# Patient Record
Sex: FEMALE | Race: WHITE
[De-identification: names, ages, dates, MRNs, and addresses within clinical notes are randomized per-mention and may not be internally consistent; named-entity substitution may affect disease eponyms.]

---

## 2019-04-02 ENCOUNTER — HOSPITAL ENCOUNTER (INPATIENT)
Dept: HOSPITAL 15 - ER | Age: 64
LOS: 2 days | Discharge: HOME | DRG: 394 | End: 2019-04-04
Attending: FAMILY MEDICINE | Admitting: NURSE PRACTITIONER
Payer: SELF-PAY

## 2019-04-02 VITALS — WEIGHT: 226.86 LBS | BODY MASS INDEX: 37.8 KG/M2 | HEIGHT: 65 IN

## 2019-04-02 VITALS — SYSTOLIC BLOOD PRESSURE: 132 MMHG | DIASTOLIC BLOOD PRESSURE: 64 MMHG

## 2019-04-02 DIAGNOSIS — Z83.3: ICD-10-CM

## 2019-04-02 DIAGNOSIS — D25.9: ICD-10-CM

## 2019-04-02 DIAGNOSIS — K42.0: Primary | ICD-10-CM

## 2019-04-02 DIAGNOSIS — E44.1: ICD-10-CM

## 2019-04-02 DIAGNOSIS — E66.01: ICD-10-CM

## 2019-04-02 DIAGNOSIS — I10: ICD-10-CM

## 2019-04-02 DIAGNOSIS — I25.10: ICD-10-CM

## 2019-04-02 DIAGNOSIS — E87.6: ICD-10-CM

## 2019-04-02 DIAGNOSIS — Z98.84: ICD-10-CM

## 2019-04-02 DIAGNOSIS — D64.9: ICD-10-CM

## 2019-04-02 DIAGNOSIS — Z90.49: ICD-10-CM

## 2019-04-02 LAB
ALBUMIN SERPL-MCNC: 3.1 G/DL (ref 3.4–5)
ALP SERPL-CCNC: 126 U/L (ref 45–117)
ALT SERPL-CCNC: 25 U/L (ref 13–56)
ANION GAP SERPL CALCULATED.3IONS-SCNC: 6 MMOL/L (ref 5–15)
APTT PPP: 27.8 SEC (ref 23.78–33.04)
BILIRUB SERPL-MCNC: 0.4 MG/DL (ref 0.2–1)
BUN SERPL-MCNC: 9 MG/DL (ref 7–18)
BUN/CREAT SERPL: 14.8
CALCIUM SERPL-MCNC: 8.6 MG/DL (ref 8.5–10.1)
CHLORIDE SERPL-SCNC: 109 MMOL/L (ref 98–107)
CO2 SERPL-SCNC: 25 MMOL/L (ref 21–32)
GLUCOSE SERPL-MCNC: 104 MG/DL (ref 74–106)
HCT VFR BLD AUTO: 37.6 % (ref 36–46)
HGB BLD-MCNC: 11.8 G/DL (ref 12.2–16.2)
INR PPP: 0.96 (ref 0.9–1.15)
MAGNESIUM SERPL-MCNC: 2.3 MG/DL (ref 1.6–2.6)
MCH RBC QN AUTO: 25.6 PG (ref 28–32)
MCV RBC AUTO: 81.8 FL (ref 80–100)
NRBC BLD QL AUTO: 0.1 %
POTASSIUM SERPL-SCNC: 3.3 MMOL/L (ref 3.5–5.1)
PROT SERPL-MCNC: 6.8 G/DL (ref 6.4–8.2)
PROTHROMBIN TIME: 10.3 SEC (ref 9.27–12.13)
SODIUM SERPL-SCNC: 140 MMOL/L (ref 136–145)

## 2019-04-02 PROCEDURE — 74250 X-RAY XM SM INT 1CNTRST STD: CPT

## 2019-04-02 PROCEDURE — 80053 COMPREHEN METABOLIC PANEL: CPT

## 2019-04-02 PROCEDURE — 83690 ASSAY OF LIPASE: CPT

## 2019-04-02 PROCEDURE — 86850 RBC ANTIBODY SCREEN: CPT

## 2019-04-02 PROCEDURE — 85025 COMPLETE CBC W/AUTO DIFF WBC: CPT

## 2019-04-02 PROCEDURE — 74176 CT ABD & PELVIS W/O CONTRAST: CPT

## 2019-04-02 PROCEDURE — 83735 ASSAY OF MAGNESIUM: CPT

## 2019-04-02 PROCEDURE — 96374 THER/PROPH/DIAG INJ IV PUSH: CPT

## 2019-04-02 PROCEDURE — 84100 ASSAY OF PHOSPHORUS: CPT

## 2019-04-02 PROCEDURE — 86901 BLOOD TYPING SEROLOGIC RH(D): CPT

## 2019-04-02 PROCEDURE — 85730 THROMBOPLASTIN TIME PARTIAL: CPT

## 2019-04-02 PROCEDURE — 85610 PROTHROMBIN TIME: CPT

## 2019-04-02 PROCEDURE — 80061 LIPID PANEL: CPT

## 2019-04-02 PROCEDURE — 83036 HEMOGLOBIN GLYCOSYLATED A1C: CPT

## 2019-04-02 PROCEDURE — 76856 US EXAM PELVIC COMPLETE: CPT

## 2019-04-02 PROCEDURE — 84443 ASSAY THYROID STIM HORMONE: CPT

## 2019-04-02 PROCEDURE — 93306 TTE W/DOPPLER COMPLETE: CPT

## 2019-04-02 PROCEDURE — 86900 BLOOD TYPING SEROLOGIC ABO: CPT

## 2019-04-02 PROCEDURE — 36415 COLL VENOUS BLD VENIPUNCTURE: CPT

## 2019-04-02 PROCEDURE — 84484 ASSAY OF TROPONIN QUANT: CPT

## 2019-04-02 PROCEDURE — 81001 URINALYSIS AUTO W/SCOPE: CPT

## 2019-04-02 RX ADMIN — ONDANSETRON HYDROCHLORIDE PRN MG: 2 INJECTION, SOLUTION INTRAMUSCULAR; INTRAVENOUS at 22:35

## 2019-04-02 RX ADMIN — POTASSIUM CHLORIDE, DEXTROSE MONOHYDRATE AND SODIUM CHLORIDE SCH MLS/HR: 300; 5; 450 INJECTION, SOLUTION INTRAVENOUS at 23:30

## 2019-04-02 RX ADMIN — MORPHINE SULFATE PRN MG: 2 INJECTION, SOLUTION INTRAMUSCULAR; INTRAVENOUS at 17:12

## 2019-04-02 RX ADMIN — ONDANSETRON HYDROCHLORIDE PRN MG: 2 INJECTION, SOLUTION INTRAMUSCULAR; INTRAVENOUS at 17:12

## 2019-04-02 RX ADMIN — POTASSIUM CHLORIDE, DEXTROSE MONOHYDRATE AND SODIUM CHLORIDE SCH MLS/HR: 300; 5; 450 INJECTION, SOLUTION INTRAVENOUS at 19:20

## 2019-04-02 NOTE — NUR
Telemetry admit from ER

HERBIEKATERYNA admitted to Telemetry unit after SBAR received.  Patient oriented to NEETU LIGHT RN primary RN, unit, room, bed, and unit policies regarding patient care and visiting 
hours. Patient now on continuous telemetry monitoring, tele box # 23 and telemetry reading 
on arrival to unit is . Patient placed on bedside oxygen, weighed by bedscale and encouraged 
to call if they need something. All questions and concerns addressed, patient verbalized 
understanding. 

Note:

## 2019-04-03 VITALS — SYSTOLIC BLOOD PRESSURE: 121 MMHG | DIASTOLIC BLOOD PRESSURE: 69 MMHG

## 2019-04-03 VITALS — DIASTOLIC BLOOD PRESSURE: 74 MMHG | SYSTOLIC BLOOD PRESSURE: 126 MMHG

## 2019-04-03 VITALS — DIASTOLIC BLOOD PRESSURE: 70 MMHG | SYSTOLIC BLOOD PRESSURE: 128 MMHG

## 2019-04-03 VITALS — DIASTOLIC BLOOD PRESSURE: 72 MMHG | SYSTOLIC BLOOD PRESSURE: 135 MMHG

## 2019-04-03 VITALS — DIASTOLIC BLOOD PRESSURE: 57 MMHG | SYSTOLIC BLOOD PRESSURE: 124 MMHG

## 2019-04-03 LAB
ALBUMIN SERPL-MCNC: 2.7 G/DL (ref 3.4–5)
ALP SERPL-CCNC: 104 U/L (ref 45–117)
ALT SERPL-CCNC: 21 U/L (ref 13–56)
ANION GAP SERPL CALCULATED.3IONS-SCNC: 3 MMOL/L (ref 5–15)
BILIRUB SERPL-MCNC: 0.4 MG/DL (ref 0.2–1)
BUN SERPL-MCNC: 12 MG/DL (ref 7–18)
BUN/CREAT SERPL: 20
CALCIUM SERPL-MCNC: 8 MG/DL (ref 8.5–10.1)
CHLORIDE SERPL-SCNC: 110 MMOL/L (ref 98–107)
CHOLEST SERPL-MCNC: 134 MG/DL (ref ?–200)
CO2 SERPL-SCNC: 29 MMOL/L (ref 21–32)
GLUCOSE SERPL-MCNC: 110 MG/DL (ref 74–106)
HCT VFR BLD AUTO: 33.1 % (ref 36–46)
HDLC SERPL-MCNC: 58 MG/DL (ref 40–59)
HGB BLD-MCNC: 10.7 G/DL (ref 12.2–16.2)
MCH RBC QN AUTO: 25.9 PG (ref 28–32)
MCV RBC AUTO: 80 FL (ref 80–100)
NRBC BLD QL AUTO: 0 %
POTASSIUM SERPL-SCNC: 3.5 MMOL/L (ref 3.5–5.1)
PROT SERPL-MCNC: 6 G/DL (ref 6.4–8.2)
SODIUM SERPL-SCNC: 142 MMOL/L (ref 136–145)
TRIGL SERPL-MCNC: 87 MG/DL (ref ?–150)

## 2019-04-03 RX ADMIN — POTASSIUM CHLORIDE, DEXTROSE MONOHYDRATE AND SODIUM CHLORIDE SCH MLS/HR: 300; 5; 450 INJECTION, SOLUTION INTRAVENOUS at 15:30

## 2019-04-03 RX ADMIN — POTASSIUM CHLORIDE, DEXTROSE MONOHYDRATE AND SODIUM CHLORIDE SCH MLS/HR: 300; 5; 450 INJECTION, SOLUTION INTRAVENOUS at 18:11

## 2019-04-03 RX ADMIN — POTASSIUM CHLORIDE, DEXTROSE MONOHYDRATE AND SODIUM CHLORIDE SCH MLS/HR: 300; 5; 450 INJECTION, SOLUTION INTRAVENOUS at 23:30

## 2019-04-03 RX ADMIN — POTASSIUM CHLORIDE, DEXTROSE MONOHYDRATE AND SODIUM CHLORIDE SCH MLS/HR: 300; 5; 450 INJECTION, SOLUTION INTRAVENOUS at 05:34

## 2019-04-03 RX ADMIN — MORPHINE SULFATE PRN MG: 2 INJECTION, SOLUTION INTRAMUSCULAR; INTRAVENOUS at 13:27

## 2019-04-03 RX ADMIN — HYDROCODONE BITARTRATE AND ACETAMINOPHEN PRN TAB: 5; 325 TABLET ORAL at 16:36

## 2019-04-03 RX ADMIN — ONDANSETRON HYDROCHLORIDE PRN MG: 2 INJECTION, SOLUTION INTRAMUSCULAR; INTRAVENOUS at 04:04

## 2019-04-03 RX ADMIN — MORPHINE SULFATE PRN MG: 2 INJECTION, SOLUTION INTRAMUSCULAR; INTRAVENOUS at 01:34

## 2019-04-03 RX ADMIN — MORPHINE SULFATE PRN MG: 2 INJECTION, SOLUTION INTRAMUSCULAR; INTRAVENOUS at 09:25

## 2019-04-03 NOTE — NUR
PATIENT C/O 5/10 HEADACHE REQUESTING PAIN MEDICATION.  INFORMED PATIENT HAS NORCO ORDERED Q 
6HRS PRN.  WILL CALL HOSPITALIST FOR TYLENOL PRN.  HOSPITALIST PAGED AWAITING CALL BACK.

## 2019-04-03 NOTE — NUR
assessment

Per  consult No PCP. Patient has no insurance. Patient to be assessed by Gloria herr [facility] 
for [facility]. Patient will then be assigned PCP. 

-------------------------------------------------------------------------------

Addendum: 04/03/19 at 1622 by Kiera Baez 

-------------------------------------------------------------------------------

Amended: Links added.

## 2019-04-03 NOTE — NUR
DR. BARRIOS MADE AWARE PT IS REQUESTING MEDICATION FOR HEADACHE OTHER THAN MORPHINE. HE 
ORDERED NORCO 5/325 PO Q6HRS PRN. HE ORDERED TO STOP MORPHINE.

## 2019-04-03 NOTE — NUR
PT SEEN BY DR. CARCAMO

PER DR. CARCAMO NO INTERVENTION AT THIS TIME, PT CAN START WITH FULL LIQUID DIET.

## 2019-04-03 NOTE — NUR
TRACY CONSULT

PT SEEN BY DR. GARCIA, SHE SAID NO INTERVENTION AT THIS TIME, PT WILL FOLLOW UP AS OUT 
PATIENT.

## 2019-04-03 NOTE — NUR
CALLED RADIOLOGY, SECOND FOLLOW UP FOR SMALL BOWEL SERIES RESULT, SPOKE WITH JUSTIN, SHE 
SAID SHE WILL FOLLOW UP THE RESULT.

## 2019-04-03 NOTE — NUR
DR. BARRIOS AT BEDSIDE

HE SAID TO FOLLOW UP SMALL BOWEL SERIES RESULT AND NOTIFY DR. CARCAMO ONCE RESULT IS BACK.

## 2019-04-03 NOTE — NUR
SMALL BOWEL SERIES RESULT

CALLED RADIOLOGY, SPOKE TO ANGELO, HE SAID HE WILL FOLLOW UP THE READING OF SMALL BOWEL 
SERIES.

## 2019-04-03 NOTE — NUR
Nutrition Assessment/consult Notes



please see activated link for complete assessment



Est. Needs based on AdBW (79 kg): 2411-5428 kcal (20-23 kcal/kgAdBW), 79-86 gms pro (1.0-1.1 
gms/kgAdBW). Will continue to monitor pertinent labs and reassess nutrient need prn




-------------------------------------------------------------------------------

Addendum: 04/03/19 at 1248 by Dali Burris RD

-------------------------------------------------------------------------------

Amended: Links added.

## 2019-04-04 VITALS — SYSTOLIC BLOOD PRESSURE: 113 MMHG | DIASTOLIC BLOOD PRESSURE: 58 MMHG

## 2019-04-04 VITALS — DIASTOLIC BLOOD PRESSURE: 55 MMHG | SYSTOLIC BLOOD PRESSURE: 122 MMHG

## 2019-04-04 VITALS — DIASTOLIC BLOOD PRESSURE: 103 MMHG | SYSTOLIC BLOOD PRESSURE: 148 MMHG

## 2019-04-04 RX ADMIN — HYDROCODONE BITARTRATE AND ACETAMINOPHEN PRN TAB: 5; 325 TABLET ORAL at 05:23

## 2019-04-04 RX ADMIN — POTASSIUM CHLORIDE, DEXTROSE MONOHYDRATE AND SODIUM CHLORIDE SCH MLS/HR: 300; 5; 450 INJECTION, SOLUTION INTRAVENOUS at 09:31

## 2019-04-04 NOTE — NUR
Discharge instructions given as ordered. Encourage to follow up with her pcp  DR. BALDERAS IN 
[place], INSTRUCTED TO FOLLOW UP WITH DOCTOR CARCAMO AND DR. GARCIA, TELEPHONE NUMBER AND 
ADDRESS PROVIDED TO THE PT. All questions and concerns addressed. Patient verbalized 
understanding.  Medication reconciliation form completed and copy given to patient. IV 
removed with catheter intact, pressure dressing applied.  Telemetry unit returned to ICU. 
Patient taken to vehicle via wheelchair with all personal belongings, accompanied by staff 
and family member. No distress noted at time of departure.

## 2019-04-04 NOTE — NUR
PT SEEN BY DR. BARRIOS

HE SAID PT CAN GO HOME , PT IS CLEARED BY DR. CARCAMO AND DR. GARCIA. HE SAID TO GIVE 
METRONIDAZOLE SCHEDULED AT 1200  AND DISCHARGE AFTER

## 2020-06-25 ENCOUNTER — HOSPITAL ENCOUNTER (INPATIENT)
Dept: HOSPITAL 15 - ER | Age: 65
LOS: 4 days | Discharge: HOME | DRG: 871 | End: 2020-06-29
Attending: INTERNAL MEDICINE | Admitting: INTERNAL MEDICINE
Payer: MEDICARE

## 2020-06-25 VITALS — BODY MASS INDEX: 35.57 KG/M2 | HEIGHT: 64 IN | WEIGHT: 208.34 LBS

## 2020-06-25 DIAGNOSIS — I10: ICD-10-CM

## 2020-06-25 DIAGNOSIS — U07.1: ICD-10-CM

## 2020-06-25 DIAGNOSIS — Z98.84: ICD-10-CM

## 2020-06-25 DIAGNOSIS — Z83.3: ICD-10-CM

## 2020-06-25 DIAGNOSIS — A41.89: Primary | ICD-10-CM

## 2020-06-25 DIAGNOSIS — E66.9: ICD-10-CM

## 2020-06-25 DIAGNOSIS — J12.89: ICD-10-CM

## 2020-06-25 LAB
ALBUMIN SERPL-MCNC: 2.9 G/DL (ref 3.4–5)
ALT SERPL-CCNC: 22 U/L (ref 13–56)
ANION GAP SERPL CALCULATED.3IONS-SCNC: 4 MMOL/L (ref 5–15)
BUN SERPL-MCNC: 11 MG/DL (ref 7–18)
BUN/CREAT SERPL: 18.3
CALCIUM SERPL-MCNC: 7.6 MG/DL (ref 8.5–10.1)
CHLORIDE SERPL-SCNC: 110 MMOL/L (ref 98–107)
CO2 SERPL-SCNC: 28 MMOL/L (ref 21–32)
GLUCOSE SERPL-MCNC: 86 MG/DL (ref 74–106)
HCT VFR BLD AUTO: 31.6 % (ref 36–46)
HGB BLD-MCNC: 9.7 G/DL (ref 12.2–16.2)
MAGNESIUM SERPL-MCNC: 2.2 MG/DL (ref 1.6–2.6)
MCH RBC QN AUTO: 22.5 PG (ref 28–32)
MCV RBC AUTO: 73.5 FL (ref 80–100)
NRBC BLD QL AUTO: 0 %
POTASSIUM SERPL-SCNC: 3.5 MMOL/L (ref 3.5–5.1)
SODIUM SERPL-SCNC: 142 MMOL/L (ref 136–145)

## 2020-06-25 PROCEDURE — 85025 COMPLETE CBC W/AUTO DIFF WBC: CPT

## 2020-06-25 PROCEDURE — 83605 ASSAY OF LACTIC ACID: CPT

## 2020-06-25 PROCEDURE — 71045 X-RAY EXAM CHEST 1 VIEW: CPT

## 2020-06-25 PROCEDURE — 80053 COMPREHEN METABOLIC PANEL: CPT

## 2020-06-25 PROCEDURE — 96365 THER/PROPH/DIAG IV INF INIT: CPT

## 2020-06-25 PROCEDURE — 85379 FIBRIN DEGRADATION QUANT: CPT

## 2020-06-25 PROCEDURE — 80048 BASIC METABOLIC PNL TOTAL CA: CPT

## 2020-06-25 PROCEDURE — 81001 URINALYSIS AUTO W/SCOPE: CPT

## 2020-06-25 PROCEDURE — 87070 CULTURE OTHR SPECIMN AEROBIC: CPT

## 2020-06-25 PROCEDURE — 87880 STREP A ASSAY W/OPTIC: CPT

## 2020-06-25 PROCEDURE — 83735 ASSAY OF MAGNESIUM: CPT

## 2020-06-25 PROCEDURE — 94640 AIRWAY INHALATION TREATMENT: CPT

## 2020-06-25 PROCEDURE — 36415 COLL VENOUS BLD VENIPUNCTURE: CPT

## 2020-06-25 PROCEDURE — 87804 INFLUENZA ASSAY W/OPTIC: CPT

## 2020-06-25 PROCEDURE — 87040 BLOOD CULTURE FOR BACTERIA: CPT

## 2020-06-25 PROCEDURE — 82728 ASSAY OF FERRITIN: CPT

## 2020-06-25 PROCEDURE — 96367 TX/PROPH/DG ADDL SEQ IV INF: CPT

## 2020-06-25 PROCEDURE — 74018 RADEX ABDOMEN 1 VIEW: CPT

## 2020-06-25 PROCEDURE — 93005 ELECTROCARDIOGRAM TRACING: CPT

## 2020-06-26 VITALS — DIASTOLIC BLOOD PRESSURE: 56 MMHG | SYSTOLIC BLOOD PRESSURE: 130 MMHG

## 2020-06-26 VITALS — DIASTOLIC BLOOD PRESSURE: 94 MMHG | SYSTOLIC BLOOD PRESSURE: 128 MMHG

## 2020-06-26 LAB
ALP SERPL-CCNC: 113 U/L (ref 45–117)
BILIRUB SERPL-MCNC: 0.3 MG/DL (ref 0.2–1)
PROT SERPL-MCNC: 6.6 G/DL (ref 6.4–8.2)

## 2020-06-26 RX ADMIN — ALBUTEROL SULFATE SCH MCG: 108 AEROSOL, METERED RESPIRATORY (INHALATION) at 22:55

## 2020-06-26 RX ADMIN — ENOXAPARIN SODIUM SCH MG: 40 INJECTION SUBCUTANEOUS at 22:56

## 2020-06-26 RX ADMIN — ENOXAPARIN SODIUM SCH MG: 40 INJECTION SUBCUTANEOUS at 11:52

## 2020-06-26 RX ADMIN — ALBUTEROL SULFATE SCH MCG: 108 AEROSOL, METERED RESPIRATORY (INHALATION) at 14:29

## 2020-06-26 NOTE — NUR
Respiratory note:

PT IS AWAKE, AND ALERT.  NO RESPIRATORY DISTRESS NOTED.  SPO2 99% ON 2L NC, HR 74, RR 10, BS 
CLEAR/DIMINISHED BILATERALLY.  1 PUFF ALBUTEROL (90MCG) GIVEN VIA MDI WITH CHAMBER.  NO 
ADVERSE EFFECTS NOTED.  WILL CONTINUE TO MONITOR PT.  CHARTING COMPLETE FROM OUTSIDE OF PT 
ROOM.

## 2020-06-26 NOTE — NUR
Telemetry admit from 

HERBIEKATERYNA THOMAS admitted to Telemetry unit.  Patient oriented to DAVID BOLIVAR, RN 
primary RN, unit, room, bed, and unit policies regarding patient care and visiting hours. 
Patient now on continuous telemetry monitoring, tele box #7 and telemetry reading on arrival 
to unit is 62. Patient placed on bedside oxygen 2L via nasal cannula, weighed by bedscale 
and encouraged to call if they need something. All questions and concerns addressed, patient 
verbalized understanding.

## 2020-06-27 VITALS — SYSTOLIC BLOOD PRESSURE: 122 MMHG | DIASTOLIC BLOOD PRESSURE: 77 MMHG

## 2020-06-27 VITALS — SYSTOLIC BLOOD PRESSURE: 125 MMHG | DIASTOLIC BLOOD PRESSURE: 70 MMHG

## 2020-06-27 VITALS — SYSTOLIC BLOOD PRESSURE: 127 MMHG | DIASTOLIC BLOOD PRESSURE: 75 MMHG

## 2020-06-27 VITALS — DIASTOLIC BLOOD PRESSURE: 66 MMHG | SYSTOLIC BLOOD PRESSURE: 136 MMHG

## 2020-06-27 VITALS — DIASTOLIC BLOOD PRESSURE: 77 MMHG | SYSTOLIC BLOOD PRESSURE: 117 MMHG

## 2020-06-27 LAB
ALBUMIN SERPL-MCNC: 2.5 G/DL (ref 3.4–5)
ALP SERPL-CCNC: 93 U/L (ref 45–117)
ALT SERPL-CCNC: 16 U/L (ref 13–56)
ANION GAP SERPL CALCULATED.3IONS-SCNC: 5 MMOL/L (ref 5–15)
BILIRUB SERPL-MCNC: 0.2 MG/DL (ref 0.2–1)
BUN SERPL-MCNC: 10 MG/DL (ref 7–18)
BUN/CREAT SERPL: 21.7
CALCIUM SERPL-MCNC: 7.6 MG/DL (ref 8.5–10.1)
CHLORIDE SERPL-SCNC: 109 MMOL/L (ref 98–107)
CO2 SERPL-SCNC: 28 MMOL/L (ref 21–32)
GLUCOSE SERPL-MCNC: 81 MG/DL (ref 74–106)
HCT VFR BLD AUTO: 29.4 % (ref 36–46)
HGB BLD-MCNC: 9.2 G/DL (ref 12.2–16.2)
MCH RBC QN AUTO: 22.8 PG (ref 28–32)
MCV RBC AUTO: 73 FL (ref 80–100)
NRBC BLD QL AUTO: 0.3 %
POTASSIUM SERPL-SCNC: 3.3 MMOL/L (ref 3.5–5.1)
PROT SERPL-MCNC: 5.8 G/DL (ref 6.4–8.2)
SODIUM SERPL-SCNC: 142 MMOL/L (ref 136–145)

## 2020-06-27 RX ADMIN — CEFTRIAXONE SODIUM SCH MLS/HR: 1 INJECTION, POWDER, FOR SOLUTION INTRAMUSCULAR; INTRAVENOUS at 09:45

## 2020-06-27 RX ADMIN — ALBUTEROL SULFATE SCH MCG: 108 AEROSOL, METERED RESPIRATORY (INHALATION) at 21:01

## 2020-06-27 RX ADMIN — ACETAMINOPHEN PRN MG: 500 TABLET ORAL at 13:20

## 2020-06-27 RX ADMIN — ALBUTEROL SULFATE SCH MCG: 108 AEROSOL, METERED RESPIRATORY (INHALATION) at 13:34

## 2020-06-27 RX ADMIN — ALBUTEROL SULFATE SCH MCG: 108 AEROSOL, METERED RESPIRATORY (INHALATION) at 06:44

## 2020-06-27 RX ADMIN — AZITHROMYCIN DIHYDRATE SCH MLS/HR: 500 INJECTION, POWDER, LYOPHILIZED, FOR SOLUTION INTRAVENOUS at 09:50

## 2020-06-27 RX ADMIN — VITAMIN D, TAB 1000IU (100/BT) SCH UNIT: 25 TAB at 09:57

## 2020-06-27 RX ADMIN — ENOXAPARIN SODIUM SCH MG: 40 INJECTION SUBCUTANEOUS at 09:56

## 2020-06-27 RX ADMIN — Medication SCH MG: at 09:47

## 2020-06-27 NOTE — NUR
Respiratory note:

1 PUFF ALBUTEROL (90MCG) GIVEN BY RN VIA MDI WITH CHAMBER, WITH NO ADVERSE EFFECTS NOTED.  
NO FURTHER RESPIRATORY INTERVENTIONS INDICATED.  CHARTING COMPLETE FROM OUTSIDE OF PT ROOM.

## 2020-06-27 NOTE — NUR
Respiratory note:

PT RESTING COMFORTABLY.  NO RESPIRATORY DISTRESS NOTED.  SPO2 96% ON 1 L NC, HR 65, RR 16, 
BS CLEAR/DIMINISHED BILATERALLY.  1 PUFF ALBUTEROL (90MCG) GIVEN BY RN VIA MDI WITH CHAMBER, 
WITH NO ADVERSE EFFECTS NOTED.  PT INFORMED TO PUSH CALL BUTTON IF INCREASED WOB, SOB, OR 
WHEEZING OCCURS.  NO FURTHER RESPIRATORY INTERVENTIONS INDICATED.  CHARTING COMPLETE FROM 
OUTSIDE OF PT ROOM.

## 2020-06-27 NOTE — NUR
Nutrition Consult/assessment Notes



Please see attached link for complete assessment



Est Energy needs ABW 73 [id number] kcals (20-23kcal/kgBW), Est Protein needs: 73-80 
gms/day (1.0-1.0 gm/kgABW). Will continue to monitor and reassess prn.




-------------------------------------------------------------------------------

Addendum: 20 at 1436 by Dali Burris RD

-------------------------------------------------------------------------------

Amended: Links added.

## 2020-06-27 NOTE — NUR
Opening Shift Note

Received report and assumed care of patient. Patient is awake and alert. No signs or 
symptoms of distress noted, patient currently denies pain. Instructed patient on plan of 
care and to call for assistance as needed. Will continue to monitor.

## 2020-06-28 VITALS — DIASTOLIC BLOOD PRESSURE: 73 MMHG | SYSTOLIC BLOOD PRESSURE: 149 MMHG

## 2020-06-28 VITALS — DIASTOLIC BLOOD PRESSURE: 69 MMHG | SYSTOLIC BLOOD PRESSURE: 145 MMHG

## 2020-06-28 VITALS — DIASTOLIC BLOOD PRESSURE: 74 MMHG | SYSTOLIC BLOOD PRESSURE: 163 MMHG

## 2020-06-28 VITALS — DIASTOLIC BLOOD PRESSURE: 97 MMHG | SYSTOLIC BLOOD PRESSURE: 152 MMHG

## 2020-06-28 RX ADMIN — POTASSIUM BICARBONATE SCH MEQ: 978 TABLET, EFFERVESCENT ORAL at 08:05

## 2020-06-28 RX ADMIN — LORAZEPAM PRN MG: 2 INJECTION, SOLUTION INTRAMUSCULAR; INTRAVENOUS at 08:06

## 2020-06-28 RX ADMIN — AZITHROMYCIN DIHYDRATE SCH MLS/HR: 500 INJECTION, POWDER, LYOPHILIZED, FOR SOLUTION INTRAVENOUS at 08:06

## 2020-06-28 RX ADMIN — CEFTRIAXONE SODIUM SCH MLS/HR: 1 INJECTION, POWDER, FOR SOLUTION INTRAMUSCULAR; INTRAVENOUS at 08:04

## 2020-06-28 RX ADMIN — ACETAMINOPHEN PRN MG: 500 TABLET ORAL at 06:34

## 2020-06-28 RX ADMIN — Medication SCH MG: at 08:05

## 2020-06-28 RX ADMIN — VITAMIN D, TAB 1000IU (100/BT) SCH UNIT: 25 TAB at 08:06

## 2020-06-28 RX ADMIN — FUROSEMIDE SCH MG: 10 INJECTION, SOLUTION INTRAMUSCULAR; INTRAVENOUS at 08:04

## 2020-06-28 RX ADMIN — ALBUTEROL SULFATE SCH MCG: 108 AEROSOL, METERED RESPIRATORY (INHALATION) at 06:23

## 2020-06-28 RX ADMIN — ALBUTEROL SULFATE SCH MCG: 108 AEROSOL, METERED RESPIRATORY (INHALATION) at 22:23

## 2020-06-28 RX ADMIN — ALBUTEROL SULFATE SCH MCG: 108 AEROSOL, METERED RESPIRATORY (INHALATION) at 13:01

## 2020-06-28 NOTE — NUR
Opening Shift Note

Received report and assumed care of patient. Patient is awake and alert. No signs or 
symptoms of distress noted. Instructed patient on plan of care and to call for assistance as 
needed. Will continue to monitor.

## 2020-06-28 NOTE — NUR
IV removal/insertion

20g IV to the Right forearm infiltrated. Discontinued IV clean technique, catheter tip fully 
intact. Pressure dressing applied to site. 

NOTE: Inserted 22g IV to the Left forearm. Patient tolerated well. 

-------------------------------------------------------------------------------

Addendum: 06/29/20 at 0917 by DAVID BOLIVAR RN RN

-------------------------------------------------------------------------------

Correct date 6/29/20

## 2020-06-29 VITALS — SYSTOLIC BLOOD PRESSURE: 135 MMHG | DIASTOLIC BLOOD PRESSURE: 81 MMHG

## 2020-06-29 VITALS — SYSTOLIC BLOOD PRESSURE: 147 MMHG | DIASTOLIC BLOOD PRESSURE: 86 MMHG

## 2020-06-29 VITALS — DIASTOLIC BLOOD PRESSURE: 76 MMHG | SYSTOLIC BLOOD PRESSURE: 152 MMHG

## 2020-06-29 LAB
ANION GAP SERPL CALCULATED.3IONS-SCNC: 3 MMOL/L (ref 5–15)
BUN SERPL-MCNC: 9 MG/DL (ref 7–18)
BUN/CREAT SERPL: 16.7
CALCIUM SERPL-MCNC: 8.6 MG/DL (ref 8.5–10.1)
CHLORIDE SERPL-SCNC: 108 MMOL/L (ref 98–107)
CO2 SERPL-SCNC: 32 MMOL/L (ref 21–32)
GLUCOSE SERPL-MCNC: 82 MG/DL (ref 74–106)
HCT VFR BLD AUTO: 31.4 % (ref 36–46)
HGB BLD-MCNC: 9.8 G/DL (ref 12.2–16.2)
MCH RBC QN AUTO: 22.5 PG (ref 28–32)
MCV RBC AUTO: 72.3 FL (ref 80–100)
NRBC BLD QL AUTO: 0.2 %
POTASSIUM SERPL-SCNC: 3.3 MMOL/L (ref 3.5–5.1)
SODIUM SERPL-SCNC: 143 MMOL/L (ref 136–145)

## 2020-06-29 RX ADMIN — VITAMIN D, TAB 1000IU (100/BT) SCH UNIT: 25 TAB at 10:03

## 2020-06-29 RX ADMIN — AZITHROMYCIN DIHYDRATE SCH MLS/HR: 500 INJECTION, POWDER, LYOPHILIZED, FOR SOLUTION INTRAVENOUS at 11:42

## 2020-06-29 RX ADMIN — LORAZEPAM PRN MG: 2 INJECTION, SOLUTION INTRAMUSCULAR; INTRAVENOUS at 10:04

## 2020-06-29 RX ADMIN — ALBUTEROL SULFATE SCH MCG: 108 AEROSOL, METERED RESPIRATORY (INHALATION) at 14:52

## 2020-06-29 RX ADMIN — POTASSIUM BICARBONATE SCH MEQ: 978 TABLET, EFFERVESCENT ORAL at 10:03

## 2020-06-29 RX ADMIN — ALBUTEROL SULFATE SCH MCG: 108 AEROSOL, METERED RESPIRATORY (INHALATION) at 06:48

## 2020-06-29 RX ADMIN — ENOXAPARIN SODIUM SCH MG: 40 INJECTION SUBCUTANEOUS at 10:03

## 2020-06-29 RX ADMIN — ACETAMINOPHEN PRN MG: 500 TABLET ORAL at 06:54

## 2020-06-29 RX ADMIN — Medication SCH MG: at 10:03

## 2020-06-29 RX ADMIN — FUROSEMIDE SCH MG: 10 INJECTION, SOLUTION INTRAMUSCULAR; INTRAVENOUS at 10:02

## 2020-06-29 RX ADMIN — ENOXAPARIN SODIUM SCH MG: 40 INJECTION SUBCUTANEOUS at 06:49

## 2020-06-29 RX ADMIN — CEFTRIAXONE SODIUM SCH MLS/HR: 1 INJECTION, POWDER, FOR SOLUTION INTRAMUSCULAR; INTRAVENOUS at 10:02

## 2020-06-29 NOTE — NUR
Discharge instructions given as ordered. Encourage to follow up with PMD as instructed. All 
questions and concerns addressed. Patient verbalized understanding.  Medication 
reconciliation form completed and copy given to patient. IV removed with catheter intact, 
pressure dressing applied.  Telemetry unit returned to ICU. Patient taken to vehicle via 
wheelchair with all personal belongings, accompanied by staff and  Guillermo. No 
distress noted at time of departure.

## 2021-10-21 NOTE — NUR
Respiratory note:

MDI GIVEN BY RN. PT TOLERATED WELL. PAOLO Pinto MD reviewed the resident note and agree with the findings and plan

## 2023-04-12 ENCOUNTER — HOSPITAL ENCOUNTER (EMERGENCY)
Dept: HOSPITAL 15 - ER | Age: 68
Discharge: HOME | End: 2023-04-12
Payer: COMMERCIAL

## 2023-04-12 VITALS — WEIGHT: 202.38 LBS | BODY MASS INDEX: 33.72 KG/M2 | HEIGHT: 65 IN

## 2023-04-12 VITALS — SYSTOLIC BLOOD PRESSURE: 146 MMHG | DIASTOLIC BLOOD PRESSURE: 64 MMHG

## 2023-04-12 DIAGNOSIS — M50.322: ICD-10-CM

## 2023-04-12 DIAGNOSIS — Z90.49: ICD-10-CM
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
## 2023-04-12 DIAGNOSIS — S16.1XXA: Primary | ICD-10-CM

## 2023-04-12 PROCEDURE — 81001 URINALYSIS AUTO W/SCOPE: CPT

## 2023-04-12 PROCEDURE — 72040 X-RAY EXAM NECK SPINE 2-3 VW: CPT
